# Patient Record
Sex: FEMALE | Race: BLACK OR AFRICAN AMERICAN
[De-identification: names, ages, dates, MRNs, and addresses within clinical notes are randomized per-mention and may not be internally consistent; named-entity substitution may affect disease eponyms.]

---

## 2018-04-18 ENCOUNTER — HOSPITAL ENCOUNTER (EMERGENCY)
Dept: HOSPITAL 72 - EMR | Age: 18
Discharge: HOME | End: 2018-04-18
Payer: MEDICAID

## 2018-04-18 VITALS — WEIGHT: 252 LBS | HEIGHT: 68 IN | BODY MASS INDEX: 38.19 KG/M2

## 2018-04-18 VITALS — DIASTOLIC BLOOD PRESSURE: 76 MMHG | SYSTOLIC BLOOD PRESSURE: 142 MMHG

## 2018-04-18 VITALS — SYSTOLIC BLOOD PRESSURE: 118 MMHG | DIASTOLIC BLOOD PRESSURE: 66 MMHG

## 2018-04-18 DIAGNOSIS — N39.0: ICD-10-CM

## 2018-04-18 DIAGNOSIS — R05: Primary | ICD-10-CM

## 2018-04-18 LAB
APPEARANCE UR: CLEAR
APTT PPP: YELLOW S
GLUCOSE UR STRIP-MCNC: NEGATIVE MG/DL
KETONES UR QL STRIP: NEGATIVE
LEUKOCYTE ESTERASE UR QL STRIP: (no result)
NITRITE UR QL STRIP: NEGATIVE
PH UR STRIP: 5 [PH] (ref 4.5–8)
PROT UR QL STRIP: (no result)
SP GR UR STRIP: 1.02 (ref 1–1.03)
UROBILINOGEN UR-MCNC: NORMAL MG/DL (ref 0–1)

## 2018-04-18 PROCEDURE — 99284 EMERGENCY DEPT VISIT MOD MDM: CPT

## 2018-04-18 PROCEDURE — 81001 URINALYSIS AUTO W/SCOPE: CPT

## 2018-04-18 PROCEDURE — 81025 URINE PREGNANCY TEST: CPT

## 2018-04-18 NOTE — EMERGENCY ROOM REPORT
History of Present Illness


General


Chief Complaint:  General Complaint


Source:  Patient





Present Illness


HPI


Patient presents to emergency department today complaining 2 weeks of worsening 

cough with yellow sputum production.  Patient complains of subjective fevers 

and chills.  She denies any weight loss.  States that coughing fairly severely 

worsened night she wants antibiotics.  In addition she complains of some mild 

dysuria.  She denies any leg pain and leg swelling.  No other complaints are 

noted patient symptoms noted to be moderate.No other modifying factors.  No 

other associated signs and symptoms.  No other complaints were noted.


Allergies:  


Coded Allergies:  


     No Known Allergies (Unverified , 4/18/18)





Patient History


Past Medical History:  none


Past Surgical History:  none


Pertinent Family History:  none


Social History:  Denies: smoking, alcohol use, drug use


Last Menstrual Period:  02/14/18


Pregnant Now:  No - Irregular menses


Reviewed Nursing Documentation:  PMH: Agreed; PSxH: Agreed





Nursing Documentation-PM


Past Medical History:  No Stated History





Review of Systems


All Other Systems:  negative except mentioned in HPI





Physical Exam





Vital Signs








  Date Time  Temp Pulse Resp B/P (MAP) Pulse Ox O2 Delivery O2 Flow Rate FiO2


 


4/18/18 09:38 98.3 75 18 142/76 96 Room Air  





 98.2       








Sp02 EP Interpretation:  reviewed, normal


General Appearance:  normal inspection, well appearing, no apparent distress, 

alert


Head:  atraumatic


Eyes:  bilateral eye normal inspection


ENT:  normal ENT inspection, hearing grossly normal, normal voice


Neck:  normal inspection, full range of motion, supple, no bony tend


Respiratory:  normal inspection, lungs clear, normal breath sounds, no 

respiratory distress, no retraction, no wheezing


Cardiovascular #1:  regular rate, rhythm, no edema


Gastrointestinal:  normal inspection, normal bowel sounds, non tender, soft, no 

guarding, no hernia


Genitourinary:  no CVA tenderness


Musculoskeletal:  normal inspection, back normal, normal range of motion


Neurologic:  normal inspection, alert, responsive, speech normal


Psychiatric:  normal inspection, judgement/insight normal, mood/affect normal


Skin:  normal inspection, normal color, no rash





Medical Decision Making


Diagnostic Impression:  


 Primary Impression:  


 Cough


 Additional Impression:  


 UTI (urinary tract infection)


ER Course


Patient presents emergency department today complaining of cough.  Differential 

considerations include pneumonia bronchitis asthma COPD just name a few.  

Patient's complains of dysuria and pelvic discomfort.  Urine is positive for 

UTI.  Therefore patient was given antibiotics and an inhaler for her 

cough.Patient is advised to follow up with primary doctor in 2-3 days and 

return the emergency room for any worsening symptoms and as needed.





Labs








Test


  4/18/18


10:20


 


Urine Color Yellow 


 


Urine Appearance Clear 


 


Urine pH 5 (4.5-8.0) 


 


Urine Specific Gravity


  1.020


(1.005-1.035)


 


Urine Protein 2+ (NEGATIVE) 


 


Urine Glucose (UA)


  Negative


(NEGATIVE)


 


Urine Ketones


  Negative


(NEGATIVE)


 


Urine Occult Blood 2+ (NEGATIVE) 


 


Urine Nitrite


  Negative


(NEGATIVE)


 


Urine Bilirubin


  Negative


(NEGATIVE)


 


Urine Urobilinogen


  Normal MG/DL


(0.0-1.0)


 


Urine Leukocyte Esterase 1+ (NEGATIVE) 


 


Urine RBC


  2-4 /HPF (0 -


2)


 


Urine WBC


  0-2 /HPF (0 -


2)


 


Urine Squamous Epithelial


Cells Few /LPF


(NONE/OCC)


 


Urine Bacteria


  Few /HPF


(NONE)


 


Urine Mucus


  Few /LPF


(NONE/OCC)


 


Urine HCG, Qualitative


  Negative


(NEGATIVE)











Last Vital Signs








  Date Time  Temp Pulse Resp B/P (MAP) Pulse Ox O2 Delivery O2 Flow Rate FiO2


 


4/18/18 11:15  82 19 118/66 96 Room Air  


 


4/18/18 09:55 98.2       





 98.2       








Status:  improved


Disposition:  HOME, SELF-CARE


Condition:  Stable


Scripts


Ibuprofen* (MOTRIN*) 600 Mg Tablet


600 MG ORAL Q6H PRN for For Pain, #15 TAB


   Prov: SHAREE RO M.D.         4/18/18 


Trimethoprim/Sulfamethoxazole 160/800* (BACTRIM DS TABLET*) 1 Each Tablet


1 TAB ORAL Q12H, #14 TAB 0 Refills


   Prov: SHAREE RO M.D.         4/18/18 


Albuterol Sulfate* (ALBUTEROL SULFATE MDI*) 8.5 Gm Hfa.aer.ad


2 PUFF INH Q4H PRN for cough/wheezing, #1 EA 0 Refills


   Prov: SHAREE RO M.D.         4/18/18


Patient Instructions:  Pelvic Pain, Female, Easy-to-Read, Bronchospasm, Adult, 

Pelvic Pain, Female











SHAREE RO M.D. Apr 18, 2018 16:05